# Patient Record
Sex: FEMALE | Race: WHITE
[De-identification: names, ages, dates, MRNs, and addresses within clinical notes are randomized per-mention and may not be internally consistent; named-entity substitution may affect disease eponyms.]

---

## 2018-03-23 NOTE — CT
ABDOMEN AND PELVIC CT SCAN WITHOUT IV CONTRAST:

 

History: 

32-year-old female with history of epigastric and abdominal pain for two weeks with diarrhea. 

 

FINDINGS: 

The lung bases are clear. The visualized liver, gallbladder, pancreas, spleen, and adrenal glands are
 unremarkable. No renal calculus or acute  obstruction. IUD within the uterus with otherwise unrema
rkable uterus and adnexal regions. No CT evidence for acute appendicitis. No abscess, adenopathy, or 
abnormal fluid collection. 

 

IMPRESSION: 

No significant acute process in the abdomen or pelvis. 

 

POS: OFF

## 2022-06-04 ENCOUNTER — HOSPITAL ENCOUNTER (EMERGENCY)
Dept: HOSPITAL 92 - CSHERS | Age: 37
Discharge: HOME | End: 2022-06-04
Payer: COMMERCIAL

## 2022-06-04 DIAGNOSIS — M54.6: Primary | ICD-10-CM

## 2022-06-04 DIAGNOSIS — R07.9: ICD-10-CM

## 2022-06-04 LAB
ALBUMIN SERPL BCG-MCNC: 4.2 G/DL (ref 3.5–5)
ALP SERPL-CCNC: 50 U/L (ref 40–110)
ALT SERPL W P-5'-P-CCNC: 8 U/L (ref 8–55)
ANION GAP SERPL CALC-SCNC: 15 MMOL/L (ref 10–20)
AST SERPL-CCNC: 13 U/L (ref 5–34)
BASOPHILS # BLD AUTO: 0.1 10X3/UL (ref 0–0.2)
BASOPHILS NFR BLD AUTO: 0.6 % (ref 0–2)
BILIRUB SERPL-MCNC: 0.7 MG/DL (ref 0.2–1.2)
BUN SERPL-MCNC: 14 MG/DL (ref 7–18.7)
CALCIUM SERPL-MCNC: 9.2 MG/DL (ref 7.8–10.44)
CHLORIDE SERPL-SCNC: 104 MMOL/L (ref 98–107)
CO2 SERPL-SCNC: 26 MMOL/L (ref 22–29)
CREAT CL PREDICTED SERPL C-G-VRATE: 0 ML/MIN (ref 70–130)
EOSINOPHIL # BLD AUTO: 0.2 10X3/UL (ref 0–0.5)
EOSINOPHIL NFR BLD AUTO: 2.4 % (ref 0–6)
GLOBULIN SER CALC-MCNC: 2.5 G/DL (ref 2.4–3.5)
GLUCOSE SERPL-MCNC: 88 MG/DL (ref 70–105)
HGB BLD-MCNC: 11.9 G/DL (ref 12–15.5)
LIPASE SERPL-CCNC: 8 U/L (ref 8–78)
LYMPHOCYTES NFR BLD AUTO: 15.8 % (ref 18–47)
MCH RBC QN AUTO: 29.7 PG (ref 27–33)
MCV RBC AUTO: 91 FL (ref 81.6–98.3)
MONOCYTES # BLD AUTO: 0.7 10X3/UL (ref 0–1.1)
MONOCYTES NFR BLD AUTO: 6.9 % (ref 0–10)
NEUTROPHILS # BLD AUTO: 7.4 10X3/UL (ref 1.5–8.4)
NEUTROPHILS NFR BLD AUTO: 74 % (ref 40–75)
PLATELET # BLD AUTO: 162 10X3/UL (ref 150–450)
POTASSIUM SERPL-SCNC: 4.3 MMOL/L (ref 3.5–5.1)
RBC # BLD AUTO: 4.01 10X6/UL (ref 3.9–5.03)
SODIUM SERPL-SCNC: 141 MMOL/L (ref 136–145)
WBC # BLD AUTO: 9.9 10X3/UL (ref 3.5–10.5)

## 2022-06-04 PROCEDURE — 93005 ELECTROCARDIOGRAM TRACING: CPT

## 2022-06-04 PROCEDURE — 71275 CT ANGIOGRAPHY CHEST: CPT

## 2022-06-04 PROCEDURE — 80053 COMPREHEN METABOLIC PANEL: CPT

## 2022-06-04 PROCEDURE — 83690 ASSAY OF LIPASE: CPT

## 2022-06-04 PROCEDURE — 96374 THER/PROPH/DIAG INJ IV PUSH: CPT

## 2022-06-04 PROCEDURE — 83880 ASSAY OF NATRIURETIC PEPTIDE: CPT

## 2022-06-04 PROCEDURE — 85025 COMPLETE CBC W/AUTO DIFF WBC: CPT

## 2022-06-04 PROCEDURE — 84484 ASSAY OF TROPONIN QUANT: CPT

## 2022-06-04 PROCEDURE — 96375 TX/PRO/DX INJ NEW DRUG ADDON: CPT

## 2022-06-08 ENCOUNTER — HOSPITAL ENCOUNTER (OUTPATIENT)
Dept: HOSPITAL 92 - LABBT | Age: 37
Discharge: HOME | End: 2022-06-08
Attending: PHYSICIAN ASSISTANT
Payer: COMMERCIAL

## 2022-06-08 DIAGNOSIS — Z20.822: ICD-10-CM

## 2022-06-08 DIAGNOSIS — K22.89: ICD-10-CM

## 2022-06-08 DIAGNOSIS — R13.10: Primary | ICD-10-CM

## 2022-06-08 PROCEDURE — U0005 INFEC AGEN DETEC AMPLI PROBE: HCPCS

## 2022-06-08 PROCEDURE — U0003 INFECTIOUS AGENT DETECTION BY NUCLEIC ACID (DNA OR RNA); SEVERE ACUTE RESPIRATORY SYNDROME CORONAVIRUS 2 (SARS-COV-2) (CORONAVIRUS DISEASE [COVID-19]), AMPLIFIED PROBE TECHNIQUE, MAKING USE OF HIGH THROUGHPUT TECHNOLOGIES AS DESCRIBED BY CMS-2020-01-R: HCPCS

## 2022-06-10 ENCOUNTER — HOSPITAL ENCOUNTER (OUTPATIENT)
Dept: HOSPITAL 92 - RAD | Age: 37
Discharge: HOME | End: 2022-06-10
Attending: PHYSICIAN ASSISTANT
Payer: COMMERCIAL

## 2022-06-10 DIAGNOSIS — K22.89: Primary | ICD-10-CM

## 2022-06-10 DIAGNOSIS — R13.10: ICD-10-CM

## 2022-06-10 PROCEDURE — 74220 X-RAY XM ESOPHAGUS 1CNTRST: CPT
